# Patient Record
Sex: FEMALE | Race: WHITE | Employment: FULL TIME | URBAN - METROPOLITAN AREA
[De-identification: names, ages, dates, MRNs, and addresses within clinical notes are randomized per-mention and may not be internally consistent; named-entity substitution may affect disease eponyms.]

---

## 2017-07-27 DIAGNOSIS — H57.89 EYE IRRITATION: Primary | ICD-10-CM

## 2017-08-21 RX ORDER — IRBESARTAN AND HYDROCHLOROTHIAZIDE 300; 12.5 MG/1; MG/1
TABLET, FILM COATED ORAL
Qty: 30 TAB | Refills: 0 | Status: SHIPPED | OUTPATIENT
Start: 2017-08-21 | End: 2017-09-16 | Stop reason: SDUPTHER

## 2017-08-21 RX ORDER — METOPROLOL TARTRATE 50 MG/1
TABLET ORAL
Qty: 60 TAB | Refills: 0 | Status: SHIPPED | OUTPATIENT
Start: 2017-08-21 | End: 2017-09-16 | Stop reason: SDUPTHER

## 2017-08-21 NOTE — TELEPHONE ENCOUNTER
Requested Prescriptions     Pending Prescriptions Disp Refills    irbesartan-hydroCHLOROthiazide (AVALIDE) 300-12.5 mg per tablet [Pharmacy Med Name: IRBESARTAN-HCTZ 300-12.5 MG TB] 30 Tab 0     Sig: TAKE ONE TABLET BY MOUTH EVERY MORNING    metoprolol tartrate (LOPRESSOR) 50 mg tablet [Pharmacy Med Name: METOPROLOL TARTRATE 50 MG T] 60 Tab 0     Sig: TAKE 1 TABLET BY MOUTH TWICE A DAY.        Last Refill: 6-22-16  Last visit:4-25-17

## 2017-09-18 RX ORDER — METOPROLOL TARTRATE 50 MG/1
TABLET ORAL
Qty: 60 TAB | Refills: 0 | Status: SHIPPED | OUTPATIENT
Start: 2017-09-18 | End: 2017-10-21 | Stop reason: SDUPTHER

## 2017-09-18 RX ORDER — IRBESARTAN AND HYDROCHLOROTHIAZIDE 300; 12.5 MG/1; MG/1
TABLET, FILM COATED ORAL
Qty: 30 TAB | Refills: 0 | Status: SHIPPED | OUTPATIENT
Start: 2017-09-18 | End: 2017-10-21 | Stop reason: SDUPTHER

## 2017-10-23 RX ORDER — METOPROLOL TARTRATE 50 MG/1
TABLET ORAL
Qty: 60 TAB | Refills: 0 | Status: SHIPPED | OUTPATIENT
Start: 2017-10-23 | End: 2017-11-13 | Stop reason: SDUPTHER

## 2017-10-23 RX ORDER — IRBESARTAN AND HYDROCHLOROTHIAZIDE 300; 12.5 MG/1; MG/1
TABLET, FILM COATED ORAL
Qty: 30 TAB | Refills: 0 | Status: SHIPPED | OUTPATIENT
Start: 2017-10-23 | End: 2017-11-13 | Stop reason: SDUPTHER

## 2017-10-23 NOTE — TELEPHONE ENCOUNTER
Requested Prescriptions     Pending Prescriptions Disp Refills    irbesartan-hydroCHLOROthiazide (AVALIDE) 300-12.5 mg per tablet [Pharmacy Med Name: IRBESARTAN-HCTZ 300-12.5 MG TB] 30 Tab 0     Sig: TAKE ONE TABLET BY MOUTH EVERY MORNING    metoprolol tartrate (LOPRESSOR) 50 mg tablet [Pharmacy Med Name: METOPROLOL TARTRATE 50 MG T] 60 Tab 0     Sig: TAKE 1 TABLET BY MOUTH TWICE A DAY.        Last Refill: 9-18-17  Last visit:4-25-17

## 2017-10-24 ENCOUNTER — OFFICE VISIT (OUTPATIENT)
Dept: INTERNAL MEDICINE CLINIC | Age: 52
End: 2017-10-24

## 2017-10-24 VITALS
BODY MASS INDEX: 38.09 KG/M2 | SYSTOLIC BLOOD PRESSURE: 136 MMHG | WEIGHT: 207 LBS | DIASTOLIC BLOOD PRESSURE: 80 MMHG | HEART RATE: 72 BPM | HEIGHT: 62 IN

## 2017-10-24 DIAGNOSIS — K12.0 APHTHOUS STOMATITIS: ICD-10-CM

## 2017-10-24 DIAGNOSIS — S16.1XXA CERVICAL STRAIN, ACUTE, INITIAL ENCOUNTER: Primary | ICD-10-CM

## 2017-10-24 PROBLEM — K63.5 COLON POLYPS: Status: ACTIVE | Noted: 2017-10-24

## 2017-10-24 PROBLEM — K21.9 GERD WITH STRICTURE: Status: ACTIVE | Noted: 2017-10-24

## 2017-10-24 PROBLEM — K22.2 GERD WITH STRICTURE: Status: ACTIVE | Noted: 2017-10-24

## 2017-10-24 PROBLEM — F98.8 ADD (ATTENTION DEFICIT DISORDER): Status: ACTIVE | Noted: 2017-10-24

## 2017-10-24 PROBLEM — I10 HYPERTENSION: Status: ACTIVE | Noted: 2017-10-24

## 2017-10-24 PROBLEM — M19.90 DJD (DEGENERATIVE JOINT DISEASE): Status: ACTIVE | Noted: 2017-10-24

## 2017-10-24 RX ORDER — DICLOFENAC SODIUM 75 MG/1
TABLET, DELAYED RELEASE ORAL
COMMUNITY

## 2017-10-24 RX ORDER — TRIAMCINOLONE ACETONIDE 1 MG/G
PASTE DENTAL 2 TIMES DAILY
Qty: 5 G | Refills: 0 | Status: SHIPPED | OUTPATIENT
Start: 2017-10-24 | End: 2018-05-15 | Stop reason: ALTCHOICE

## 2017-10-24 NOTE — MR AVS SNAPSHOT
Visit Information Date & Time Provider Department Dept. Phone Encounter #  
 10/24/2017  3:30 PM Mark Ge Marissa Gonzalez 26 [phone] [id number] Follow-up Instructions Return if symptoms worsen or fail to improve. Upcoming Health Maintenance Date Due Hepatitis C Screening 1965 PAP AKA CERVICAL CYTOLOGY 4/6/1986 BREAST CANCER SCRN MAMMOGRAM 4/6/2015 FOBT Q 1 YEAR AGE 50-75 4/6/2015 INFLUENZA AGE 9 TO ADULT 8/1/2017 DTaP/Tdap/Td series (2 - Td) 12/14/2026 Allergies as of 10/24/2017  Review Complete On: 10/24/2017 By: Mark Ge MD  
  
 Severity Noted Reaction Type Reactions Minocin [Minocycline]  12/01/2011    Itching Skelaxin [Metaxalone]  12/01/2011    Itching Current Immunizations  Never Reviewed Name Date Tdap 12/14/2016  6:22 PM  
  
 Not reviewed this visit You Were Diagnosed With   
  
 Codes Comments Cervical strain, acute, initial encounter    -  Primary ICD-10-CM: S16. Leti Barriosinz ICD-9-CM: 847.0 Aphthous stomatitis     ICD-10-CM: K12.0 ICD-9-CM: 528.2 Vitals BP Pulse Height(growth percentile) Weight(growth percentile) BMI OB Status 136/80 (BP 1 Location: Left arm, BP Patient Position: Sitting) 72 5' 2\" (1.575 m) 207 lb (93.9 kg) 37.86 kg/m2 Having regular periods Smoking Status Never Smoker BMI and BSA Data Body Mass Index Body Surface Area  
 37.86 kg/m 2 2.03 m 2 Preferred Pharmacy Pharmacy Name Phone [address], [address] [place] [address] [phone] Your Updated Medication List  
  
   
This list is accurate as of: 10/24/17  4:26 PM.  Always use your most recent med list.  
  
  
  
  
 diclofenac EC 75 mg EC tablet Commonly known as:  VOLTAREN Take  by mouth. famotidine 10 mg tablet Commonly known as:  PEPCID Take 10 mg by mouth two (2) times a day. irbesartan-hydroCHLOROthiazide 300-12.5 mg per tablet Commonly known as:  AVALIDE  
TAKE ONE TABLET BY MOUTH EVERY MORNING  
  
 LO-OVRAL (8) 0.3-30 mg-mcg Tab Generic drug:  norgestrel-ethinyl estradiol Take 1 Tab by mouth daily. metoprolol tartrate 50 mg tablet Commonly known as:  LOPRESSOR  
TAKE 1 TABLET BY MOUTH TWICE A DAY. triamcinolone acetonide 0.1 % dental paste Commonly known as:  KENALOG  
by Dental route two (2) times a day. Prescriptions Sent to Pharmacy Refills  
 triamcinolone acetonide (KENALOG) 0.1 % dental paste 0 Sig: by Dental route two (2) times a day. Class: Normal  
 Pharmacy: [address] #: [phone] Route: Dental  
  
Follow-up Instructions Return if symptoms worsen or fail to improve. Introducing [facility]! Romayne Duster introduces [employer] patient portal. Now you can access parts of your medical record, email your doctor's office, and request medication refills online. 1. In your internet browser, go to [email or web address] 2. Click on the First Time User? Click Here link in the Sign In box. You will see the New Member Sign Up page. 3. Enter your [employer] Access Code exactly as it appears below. You will not need to use this code after youve completed the sign-up process. If you do not sign up before the expiration date, you must request a new code. · [employer] Access Code: [email or web address] Expires: 1/22/2018  3:29 PM 
 
4. Enter the last four digits of your Social Security Number (xxxx) and Date of Birth (mm/dd/yyyy) as indicated and click Submit. You will be taken to the next sign-up page. 5. Create a General Specifict ID. This will be your [employer] login ID and cannot be changed, so think of one that is secure and easy to remember. 6. Create a General Specifict password. You can change your password at any time. 7. Enter your Password Reset Question and Answer. This can be used at a later time if you forget your password. 8. Enter your e-mail address. You will receive e-mail notification when new information is available in [address] 9. Click Sign Up. You can now view and download portions of your medical record. 10. Click the Download Summary menu link to download a portable copy of your medical information. If you have questions, please visit the Frequently Asked Questions section of the [employer] website. Remember, [employer] is NOT to be used for urgent needs. For medical emergencies, dial 911. Now available from your iPhone and Android! Please provide this summary of care documentation to your next provider. Your primary care clinician is listed as VERONICA Kennedy 21. If you have any questions after today's visit, please call [phone].
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
## 2017-10-24 NOTE — PATIENT INSTRUCTIONS
Neck Strain: Care Instructions  Your Care Instructions  You have strained the muscles and ligaments in your neck. A sudden, awkward movement can strain the neck. This often occurs with falls or car accidents or during certain sports. Everyday activities like working on a computer or sleeping can also cause neck strain if they force you to hold your neck in an awkward position for a long time. It is common for neck pain to get worse for a day or two after an injury, but it should start to feel better after that. You may have more pain and stiffness for several days before it gets better. This is expected. It may take a few weeks or longer for it to heal completely. Good home treatment can help you get better faster and avoid future neck problems. Follow-up care is a key part of your treatment and safety. Be sure to make and go to all appointments, and call your doctor if you are having problems. It's also a good idea to know your test results and keep a list of the medicines you take. How can you care for yourself at home? · If you were given a neck brace (cervical collar) to limit neck motion, wear it as instructed for as many days as your doctor tells you to. Do not wear it longer than you were told to. Wearing a brace for too long can make neck stiffness worse and weaken the neck muscles. · You can try using heat or ice to see if it helps. ¨ Try using a heating pad on a low or medium setting for 15 to 20 minutes every 2 to 3 hours. Try a warm shower in place of one session with the heating pad. You can also buy single-use heat wraps that last up to 8 hours. ¨ You can also try an ice pack for 10 to 15 minutes every 2 to 3 hours. · Take pain medicines exactly as directed. ¨ If the doctor gave you a prescription medicine for pain, take it as prescribed. ¨ If you are not taking a prescription pain medicine, ask your doctor if you can take an over-the-counter medicine.   · Gently rub the area to relieve pain and help with blood flow. Do not massage the area if it hurts to do so. · Do not do anything that makes the pain worse. Take it easy for a couple of days. You can do your usual activities if they do not hurt your neck or put it at risk for more stress or injury. · Try sleeping on a special neck pillow. Place it under your neck, not under your head. Placing a tightly rolled-up towel under your neck while you sleep will also work. If you use a neck pillow or rolled towel, do not use your regular pillow at the same time. · To prevent future neck pain, do exercises to stretch and strengthen your neck and back. Learn how to use good posture, safe lifting techniques, and proper body mechanics. When should you call for help? Call 911 anytime you think you may need emergency care. For example, call if:  · You are unable to move an arm or a leg at all. Call your doctor now or seek immediate medical care if:  · You have new or worse symptoms in your arms, legs, chest, belly, or buttocks. Symptoms may include:  ¨ Numbness or tingling. ¨ Weakness. ¨ Pain. · You lose bladder or bowel control. Watch closely for changes in your health, and be sure to contact your doctor if:  · You are not getting better as expected. Where can you learn more? Go to http://tracie-joel.info/. Enter M253 in the search box to learn more about \"Neck Strain: Care Instructions. \"  Current as of: March 21, 2017  Content Version: 11.3  © 9576-4827 [employer]. Care instructions adapted under license by [employer] (which disclaims liability or warranty for this information). If you have questions about a medical condition or this instruction, always ask your healthcare professional. [address] any warranty or liability for your use of this information.
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
## 2018-02-12 ENCOUNTER — TELEPHONE (OUTPATIENT)
Dept: INTERNAL MEDICINE CLINIC | Age: 53
End: 2018-02-12

## 2018-02-12 NOTE — TELEPHONE ENCOUNTER
Patient had appt scheduled for 02/14/18 for ear pain and headache. Decided to cancel and be seen at the [facility] first and would like referral sent over.

## 2018-05-15 ENCOUNTER — OFFICE VISIT (OUTPATIENT)
Dept: INTERNAL MEDICINE CLINIC | Age: 53
End: 2018-05-15

## 2018-05-15 VITALS
HEART RATE: 80 BPM | WEIGHT: 211 LBS | SYSTOLIC BLOOD PRESSURE: 102 MMHG | OXYGEN SATURATION: 98 % | TEMPERATURE: 97.9 F | HEIGHT: 62 IN | BODY MASS INDEX: 38.83 KG/M2 | DIASTOLIC BLOOD PRESSURE: 70 MMHG

## 2018-05-15 DIAGNOSIS — J02.9 ACUTE PHARYNGITIS, UNSPECIFIED ETIOLOGY: Primary | ICD-10-CM

## 2018-05-15 RX ORDER — PANTOPRAZOLE SODIUM 40 MG/1
40 TABLET, DELAYED RELEASE ORAL DAILY
COMMUNITY

## 2018-05-15 RX ORDER — AMOXICILLIN 500 MG/1
500 CAPSULE ORAL 3 TIMES DAILY
Qty: 30 CAP | Refills: 0 | Status: SHIPPED | OUTPATIENT
Start: 2018-05-15 | End: 2018-05-25

## 2018-05-15 NOTE — LETTER
NOTIFICATION RETURN TO WORK / SCHOOL 
 
5/15/2018 9:37 AM 
 
Ms. Link Romero [address] [place] [address] [phone] To Whom It May Concern: 
 
Link Romero is currently under the care of Marissa Soni. She will return to work/school on: 5-16-18. If there are questions or concerns please have the patient contact our office. Sincerely, Jazmine Gupta MD

## 2018-05-15 NOTE — PATIENT INSTRUCTIONS
Sore Throat: Care Instructions  Your Care Instructions    Infection by bacteria or a virus causes most sore throats. Cigarette smoke, dry air, air pollution, allergies, and yelling can also cause a sore throat. Sore throats can be painful and annoying. Fortunately, most sore throats go away on their own. If you have a bacterial infection, your doctor may prescribe antibiotics. Follow-up care is a key part of your treatment and safety. Be sure to make and go to all appointments, and call your doctor if you are having problems. It's also a good idea to know your test results and keep a list of the medicines you take. How can you care for yourself at home? · If your doctor prescribed antibiotics, take them as directed. Do not stop taking them just because you feel better. You need to take the full course of antibiotics. · Gargle with warm salt water once an hour to help reduce swelling and relieve discomfort. Use 1 teaspoon of salt mixed in 1 cup of warm water. · Take an over-the-counter pain medicine, such as acetaminophen (Tylenol), ibuprofen (Advil, Motrin), or naproxen (Aleve). Read and follow all instructions on the label. · Be careful when taking over-the-counter cold or flu medicines and Tylenol at the same time. Many of these medicines have acetaminophen, which is Tylenol. Read the labels to make sure that you are not taking more than the recommended dose. Too much acetaminophen (Tylenol) can be harmful. · Drink plenty of fluids. Fluids may help soothe an irritated throat. Hot fluids, such as tea or soup, may help decrease throat pain. · Use over-the-counter throat lozenges to soothe pain. Regular cough drops or hard candy may also help. These should not be given to young children because of the risk of choking. · Do not smoke or allow others to smoke around you. If you need help quitting, talk to your doctor about stop-smoking programs and medicines.  These can increase your chances of quitting for good. · Use a vaporizer or humidifier to add moisture to your bedroom. Follow the directions for cleaning the machine. When should you call for help? Call your doctor now or seek immediate medical care if:  ? · You have new or worse trouble swallowing. ? · Your sore throat gets much worse on one side. ? Watch closely for changes in your health, and be sure to contact your doctor if you do not get better as expected. Where can you learn more? Go to http://rtacie-joel.info/. Enter 062 441 80 [id number] in the search box to learn more about \"Sore Throat: Care Instructions. \"  Current as of: May 12, 2017  Content Version: 11.4  © 7040-1731 [employer]. Care instructions adapted under license by [employer] (which disclaims liability or warranty for this information). If you have questions about a medical condition or this instruction, always ask your healthcare professional. [place] [address] any warranty or liability for your use of this information. Learning About Cutting Calories  How do calories affect your weight? Food gives your body energy. Energy from the food you eat is measured in calories. This energy keeps your heart beating, your brain active, and your muscles working. Your body needs a certain number of calories each day. After your body uses the calories it needs, it stores extra calories as fat. To lose weight safely, you have to eat fewer calories while eating in a healthy way. How many calories do you need each day? The more active you are, the more calories you need. When you are less active, you need fewer calories. How many calories you need each day also depends on several things, including your age and whether you are male or female. Here are some general guidelines for adults:  · Less active women and older adults need 1,600 to 2,000 calories each day.   · Active women and less active men need 2,000 to 2,400 calories each day.  · Active men need 2,400 to 3,000 calories each day. How can you cut calories and eat healthy meals? Whole grains, vegetables and fruits, and dried beans are good lower-calorie foods. They give you lots of nutrients and fiber. And they fill you up. Sweets, energy drinks, and soda pop are high in calories. They give you few nutrients and no fiber. Try to limit soda pop, fruit juice, and energy drinks. Drink water instead. Some fats can be part of a healthy diet. But cutting back on fats from highly processed foods like fast foods and many snack foods is a good way to lower the calories in your diet. Also, use smaller amounts of fats like butter, margarine, salad dressing, and mayonnaise. Add fresh garlic, lemon, or herbs to your meals to add flavor without adding fat. Meats and dairy products can be a big source of hidden fats. Try to choose lean or low-fat versions of these products. Fat-free cookies, candies, chips, and frozen treats can still be high in sugar and calories. Some fat-free foods have more calories than regular ones. Eat fat-free treats in moderation, as you would other foods. If your favorite foods are high in fat, salt, sugar, or calories, limit how often you eat them. Eat smaller servings, or look for healthy substitutes. Fill up on fruits, vegetables, and whole grains. Eating at home  · Use meat as a side dish instead of as the main part of your meal.  · Try main dishes that use whole wheat pasta, brown rice, dried beans, or vegetables. · Find ways to cook with little or no fat, such as broiling, steaming, or grilling. · Use cooking spray instead of oil. If you use oil, use a monounsaturated oil, such as canola or olive oil. · Trim fat from meats before you cook them. · Drain off fat after you brown the meat or while you roast it. · Chill soups and stews after you cook them. Then skim the fat off the top after it hardens.   Eating out  · Order foods that are broiled or poached rather than fried or breaded. · Cut back on the amount of butter or margarine that you use on bread. · Order sauces, gravies, and salad dressings on the side, and use only a little. · When you order pasta, choose tomato sauce rather than cream sauce. · Ask for salsa with your baked potato instead of sour cream, butter, cheese, or foreman. · Order meals in a small size instead of upgrading to a large. · Share an entree, or take part of your food home to eat as another meal.  · Share appetizers and desserts. Where can you learn more? Go to http://tracie-joel.info/. Enter 99 [id number] in the search box to learn more about \"Learning About Cutting Calories. \"  Current as of: May 12, 2017  Content Version: 11.4  © 4813-0142 [employer]. Care instructions adapted under license by [employer] (which disclaims liability or warranty for this information). If you have questions about a medical condition or this instruction, always ask your healthcare professional. [place] [address] any warranty or liability for your use of this information.

## 2018-05-15 NOTE — PROGRESS NOTES
Power Stevens is a 48 y.o. female presenting for Sore Throat and Ear Fullness  . 1. Have you been to the ER, urgent care clinic since your last visit? Hospitalized since your last visit? No    2. Have you seen or consulted any other health care providers outside of the [address] [place] [address] since your last visit? Include any pap smears or colon screening. No    No flowsheet data found. No flowsheet data found. PHQ over the last two weeks 5/15/2018   Little interest or pleasure in doing things Several days   Feeling down, depressed or hopeless Not at all   Total Score PHQ 2 1       There are no discontinued medications.

## 2018-05-15 NOTE — PROGRESS NOTES
This note will not be viewable in [address] Marco Obregon is a 48 y.o. female and presents with Sore Throat and Ear Fullness  . Subjective:  Mrs. Sam Baltazar presents to the office today with 3 days worth of a sore throat. She is noted neck gland swelling and tenderness. She ran a low-grade fever of 99.7 when taken by the school nurse yesterday. She states that she has been around a lot of sick kids were recently some of which had sore throats as well. She denies rhinorrhea or cough there is been no neck stiffness or rash. Past Medical History:   Diagnosis Date    ADD (attention deficit disorder) 10/24/2017    Colon polyps 10/24/2017    DJD (degenerative joint disease) 10/24/2017    GERD with stricture 10/24/2017    Hypertension      Past Surgical History:   Procedure Laterality Date    HX CHOLECYSTECTOMY      HX GYN          HX ORTHOPAEDIC      right ankle surgery     Allergies   Allergen Reactions    Minocin [Minocycline] Itching    Skelaxin [Metaxalone] Itching     Current Outpatient Prescriptions   Medication Sig Dispense Refill    pantoprazole (PROTONIX) 40 mg tablet Take 40 mg by mouth daily. Indications: takes 1/2 tab qd      amoxicillin (AMOXIL) 500 mg capsule Take 1 Cap by mouth three (3) times daily for 10 days. 30 Cap 0    metoprolol tartrate (LOPRESSOR) 50 mg tablet TAKE 1 TABLET BY MOUTH TWICE A DAY. 60 Tab 6    irbesartan-hydroCHLOROthiazide (AVALIDE) 300-12.5 mg per tablet TAKE ONE TABLET BY MOUTH EVERY MORNING 30 Tab 6    diclofenac EC (VOLTAREN) 75 mg EC tablet Take  by mouth.        Social History     Social History    Marital status:      Spouse name: N/A    Number of children: N/A    Years of education: N/A     Social History Main Topics    Smoking status: Never Smoker    Smokeless tobacco: Never Used    Alcohol use Yes      Comment: rarely    Drug use: No    Sexual activity: Not Asked     Other Topics Concern    None     Social History Narrative     Family History   Problem Relation Age of Onset    Cancer Mother      breast    Hypertension Father     Elevated Lipids Father        Review of Systems  Constitutional: negative for fevers, chills, anorexia and weight loss  Eyes:   negative for visual disturbance and irritation  ENT:   Positive for sore throat, drainage. Denies dysphageia. LN's tender. Respiratory:  negative for cough, hemoptysis, dyspnea,wheezing  CV:   negative for chest pain, palpitations, lower extremity edema  GI:   negative for nausea, vomiting, diarrhea, abdominal pain,melena  Integumentary: negative for rash and pruritus  Neurological:  negative for headaches, dizziness, vertigo, memory problems and gait       Objective:  Visit Vitals    /70 (BP 1 Location: Right arm, BP Patient Position: Sitting)    Pulse 80    Temp 97.9 °F (36.6 °C)    Ht 5' 2\" (1.575 m)    Wt 211 lb (95.7 kg)    SpO2 98%    BMI 38.59 kg/m2     Body mass index is 38.59 kg/(m^2). Physical Exam:   General appearance - alert, well appearing, and in no distress  Mental status - alert, oriented to person, place, and time  EYE-ANNMARIE, EOMI, sclera clear. No lid swelling or purulent drainage  ENT- TM's clear without A/F level. Pharynx erythematous with drainage noted  Nose - normal and patent, no erythema,  Neck - supple, with tender anterior nodes   Chest - clear to auscultation, no wheezes, rales or rhonchi, symmetric air entry   Heart - normal rate, regular rhythm, normal S1, S2, no murmurs, rubs, clicks or gallops   Skin-No rash appreciated  Neuro -alert, oriented, normal speech, no focal findings. Assessment/Plan:  Diagnoses and all orders for this visit:    Acute pharyngitis, unspecified etiology  -     amoxicillin (AMOXIL) 500 mg capsule; Take 1 Cap by mouth three (3) times daily for 10 days. , Normal, Disp-30 Cap, R-0        Other Instructions:  Warm salt water gargles to be started    Tylenol and chloraseptic spray to be used symptomatically    Increase po fluids    Body mass index is 38.59 and dietary counseling along with printed patient education is given    Follow-up Disposition:  Return if symptoms worsen or fail to improve. I have reviewed with the patient details of the assessment and plan and all questions were answered. Relevent patient education was performed. An After Visit Summary was printed and given to the patient.     Amelia Paez MD

## 2018-05-15 NOTE — MR AVS SNAPSHOT
[address] [address] [address] [address] [phone] Patient: Manoj Ruiz MRN: [id number] :1965 Visit Information Date & Time Provider Department Dept. Phone Encounter #  
 5/15/2018  9:20 AM Michael Rodrigues MD [address] [id number] Follow-up Instructions Return if symptoms worsen or fail to improve. Upcoming Health Maintenance Date Due Hepatitis C Screening 1965 PAP AKA CERVICAL CYTOLOGY 1986 BREAST CANCER SCRN MAMMOGRAM 2015 FOBT Q 1 YEAR AGE 50-75 2015 Influenza Age 5 to Adult 2018 DTaP/Tdap/Td series (2 - Td) 2026 Allergies as of 5/15/2018  Review Complete On: 5/15/2018 By: Michael Rodrigues MD  
  
 Severity Noted Reaction Type Reactions Minocin [Minocycline]  2011    Itching Skelaxin [Metaxalone]  2011    Itching Current Immunizations  Never Reviewed Name Date Tdap 2016  6:22 PM  
  
 Not reviewed this visit You Were Diagnosed With   
  
 Codes Comments Acute pharyngitis, unspecified etiology    -  Primary ICD-10-CM: J02.9 ICD-9-CM: 208 Vitals BP Pulse Temp Height(growth percentile) Weight(growth percentile) SpO2  
 102/70 (BP 1 Location: Right arm, BP Patient Position: Sitting) 80 97.9 °F (36.6 °C) 5' 2\" (1.575 m) 211 lb (95.7 kg) 98% BMI OB Status Smoking Status 38.59 kg/m2 Having regular periods Never Smoker BMI and BSA Data Body Mass Index Body Surface Area 38.59 kg/m 2 2.05 m 2 Preferred Pharmacy Pharmacy Name Phone [address], [address] [facility] [phone] Your Updated Medication List  
  
   
This list is accurate as of 5/15/18  9:37 AM.  Always use your most recent med list.  
  
  
  
  
 diclofenac EC 75 mg EC tablet Commonly known as:  VOLTAREN  
 Take  by mouth. irbesartan-hydroCHLOROthiazide 300-12.5 mg per tablet Commonly known as:  AVALIDE  
TAKE ONE TABLET BY MOUTH EVERY MORNING  
  
 metoprolol tartrate 50 mg tablet Commonly known as:  LOPRESSOR  
TAKE 1 TABLET BY MOUTH TWICE A DAY. pantoprazole 40 mg tablet Commonly known as:  PROTONIX Take 40 mg by mouth daily. Indications: takes 1/2 tab qd Follow-up Instructions Return if symptoms worsen or fail to improve. Introducing [facility]! Prerna Duenas introduces [employer] patient portal. Now you can access parts of your medical record, email your doctor's office, and request medication refills online. 1. In your internet browser, go to [email or web address] 2. Click on the First Time User? Click Here link in the Sign In box. You will see the New Member Sign Up page. 3. Enter your [employer] Access Code exactly as it appears below. You will not need to use this code after youve completed the sign-up process. If you do not sign up before the expiration date, you must request a new code. · [employer] Access Code: [email or web address] Expires: 8/13/2018  9:22 AM 
 
4. Enter the last four digits of your Social Security Number (xxxx) and Date of Birth (mm/dd/yyyy) as indicated and click Submit. You will be taken to the next sign-up page. 5. Create a [employer] ID. This will be your [employer] login ID and cannot be changed, so think of one that is secure and easy to remember. 6. Create a [employer] password. You can change your password at any time. 7. Enter your Password Reset Question and Answer. This can be used at a later time if you forget your password. 8. Enter your e-mail address. You will receive e-mail notification when new information is available in [address] 9. Click Sign Up. You can now view and download portions of your medical record. 10. Click the Download Summary menu link to download a portable copy of your medical information. If you have questions, please visit the Frequently Asked Questions section of the [employer] website. Remember, [employer] is NOT to be used for urgent needs. For medical emergencies, dial 911. Now available from your iPhone and Android! Please provide this summary of care documentation to your next provider. Your primary care clinician is listed as VERONICA Mcgowan. If you have any questions after today's visit, please call [phone].

## 2018-06-25 RX ORDER — IRBESARTAN AND HYDROCHLOROTHIAZIDE 300; 12.5 MG/1; MG/1
TABLET, FILM COATED ORAL
Qty: 30 TAB | Refills: 0 | Status: SHIPPED | OUTPATIENT
Start: 2018-06-25 | End: 2018-08-02 | Stop reason: SDUPTHER

## 2018-08-02 RX ORDER — METOPROLOL TARTRATE 50 MG/1
TABLET ORAL
Qty: 60 TAB | Refills: 6 | Status: SHIPPED | OUTPATIENT
Start: 2018-08-02 | End: 2018-08-21 | Stop reason: SDUPTHER

## 2018-08-02 RX ORDER — IRBESARTAN AND HYDROCHLOROTHIAZIDE 300; 12.5 MG/1; MG/1
TABLET, FILM COATED ORAL
Qty: 30 TAB | Refills: 0 | Status: SHIPPED | OUTPATIENT
Start: 2018-08-02 | End: 2018-08-21 | Stop reason: SDUPTHER

## 2018-08-02 NOTE — TELEPHONE ENCOUNTER
Pharmacy on file verified  Requested Prescriptions     Pending Prescriptions Disp Refills    irbesartan-hydroCHLOROthiazide (AVALIDE) 300-12.5 mg per tablet 30 Tab 0    metoprolol tartrate (LOPRESSOR) 50 mg tablet 60 Tab 6     LOV:5/15/18   MPW:0/15/6134

## 2018-08-21 ENCOUNTER — OFFICE VISIT (OUTPATIENT)
Dept: INTERNAL MEDICINE CLINIC | Age: 53
End: 2018-08-21

## 2018-08-21 VITALS
DIASTOLIC BLOOD PRESSURE: 72 MMHG | TEMPERATURE: 98.1 F | RESPIRATION RATE: 18 BRPM | HEART RATE: 69 BPM | WEIGHT: 212.2 LBS | BODY MASS INDEX: 39.05 KG/M2 | SYSTOLIC BLOOD PRESSURE: 114 MMHG | OXYGEN SATURATION: 96 % | HEIGHT: 62 IN

## 2018-08-21 DIAGNOSIS — I10 ESSENTIAL HYPERTENSION: Primary | ICD-10-CM

## 2018-08-21 RX ORDER — IRBESARTAN AND HYDROCHLOROTHIAZIDE 300; 12.5 MG/1; MG/1
TABLET, FILM COATED ORAL
Qty: 90 TAB | Refills: 3 | Status: SHIPPED | OUTPATIENT
Start: 2018-08-21

## 2018-08-21 RX ORDER — METOPROLOL TARTRATE 50 MG/1
TABLET ORAL
Qty: 180 TAB | Refills: 3 | Status: SHIPPED | OUTPATIENT
Start: 2018-08-21

## 2018-08-21 NOTE — PATIENT INSTRUCTIONS

## 2018-08-21 NOTE — MR AVS SNAPSHOT
[address] [address] [address] [id number] [phone] Patient: Tri Bhandari MRN: [id number] :1965 Visit Information Date & Time Provider Department Dept. Phone Encounter #  
 2018 11:00 AM Carla Colbert MD [address] [address] [phone] [id number] Follow-up Instructions Return in about 1 year (around 2019). Upcoming Health Maintenance Date Due Hepatitis C Screening 1965 PAP AKA CERVICAL CYTOLOGY 1986 BREAST CANCER SCRN MAMMOGRAM 2015 FOBT Q 1 YEAR AGE 50-75 2015 Influenza Age 5 to Adult 2018 DTaP/Tdap/Td series (2 - Td) 2026 Allergies as of 2018  Review Complete On: 2018 By: Carla Colbert MD  
  
 Severity Noted Reaction Type Reactions Minocin [Minocycline]  2011    Itching Skelaxin [Metaxalone]  2011    Itching Current Immunizations  Never Reviewed Name Date Tdap 2016  6:22 PM  
  
 Not reviewed this visit You Were Diagnosed With   
  
 Codes Comments Essential hypertension    -  Primary ICD-10-CM: I10 
ICD-9-CM: 401.9 Vitals BP Pulse Temp Resp Height(growth percentile) Weight(growth percentile) 114/72 (BP 1 Location: Right arm, BP Patient Position: Sitting) 69 98.1 °F (36.7 °C) (Oral) 18 5' 2\" (1.575 m) 212 lb 3.2 oz (96.3 kg) SpO2 BMI OB Status Smoking Status 96% 38.81 kg/m2 Having regular periods Never Smoker BMI and BSA Data Body Mass Index Body Surface Area  
 38.81 kg/m 2 2.05 m 2 Preferred Pharmacy Pharmacy Name Phone [address], [address] [place] [address] [phone] Your Updated Medication List  
  
   
This list is accurate as of 18 11:35 AM.  Always use your most recent med list.  
  
  
  
  
 AVENOVA 0.01 % Spry Generic drug:  hypochlorous acid-sodium chlor by Apply Externally route. diclofenac EC 75 mg EC tablet Commonly known as:  VOLTAREN Take  by mouth. irbesartan-hydroCHLOROthiazide 300-12.5 mg per tablet Commonly known as:  AVALIDE  
TAKE ONE TABLET BY MOUTH EVERY MORNING  
  
 metoprolol tartrate 50 mg tablet Commonly known as:  LOPRESSOR  
TAKE 1 TABLET BY MOUTH TWICE A DAY. nitroglycerin 2 % ointment Commonly known as:  NITROBID Apply 1 Inch to affected area every four (4) hours. pantoprazole 40 mg tablet Commonly known as:  PROTONIX Take 40 mg by mouth daily. Indications: takes 1/2 tab qd Prescriptions Sent to Pharmacy Refills  
 irbesartan-hydroCHLOROthiazide (AVALIDE) 300-12.5 mg per tablet 3 Sig: TAKE ONE TABLET BY MOUTH EVERY MORNING Class: Normal  
 Pharmacy: [id number] Ph #: [phone]  
 metoprolol tartrate (LOPRESSOR) 50 mg tablet 3 Sig: TAKE 1 TABLET BY MOUTH TWICE A DAY. Class: Normal  
 Pharmacy: [address], [address] [place] [address] [facility] Ph #: [phone] We Performed the Following CBC WITH AUTOMATED DIFF [03130 CPT(R)] COLLECTION VENOUS BLOOD,VENIPUNCTURE U3599648 CPT(R)] LIPID PANEL [66059 CPT(R)] METABOLIC PANEL, COMPREHENSIVE [29415 CPT(R)] URINALYSIS W/ RFLX MICROSCOPIC [81713 CPT(R)] Follow-up Instructions Return in about 1 year (around 8/21/2019). Introducing [facility]! Roseann Kirkpatrick introduces [employer] patient portal. Now you can access parts of your medical record, email your doctor's office, and request medication refills online. 1. In your internet browser, go to [email or web address] 2. Click on the First Time User? Click Here link in the Sign In box. You will see the New Member Sign Up page. 3. Enter your [employer] Access Code exactly as it appears below.  You will not need to use this code after youve completed the sign-up process. If you do not sign up before the expiration date, you must request a new code. · [employer] Access Code: [email or web address] Expires: 11/19/2018 11:01 AM 
 
4. Enter the last four digits of your Social Security Number (xxxx) and Date of Birth (mm/dd/yyyy) as indicated and click Submit. You will be taken to the next sign-up page. 5. Create a [employer] ID. This will be your Hi[employer] login ID and cannot be changed, so think of one that is secure and easy to remember. 6. Create a [employer] password. You can change your password at any time. 7. Enter your Password Reset Question and Answer. This can be used at a later time if you forget your password. 8. Enter your e-mail address. You will receive e-mail notification when new information is available in [address] 9. Click Sign Up. You can now view and download portions of your medical record. 10. Click the Download Summary menu link to download a portable copy of your medical information. If you have questions, please visit the Frequently Asked Questions section of the [employer] website. Remember, [employer] is NOT to be used for urgent needs. For medical emergencies, dial 911. Now available from your iPhone and Android! Please provide this summary of care documentation to your next provider. Your primary care clinician is listed as VERONICA Kennedy 21. If you have any questions after today's visit, please call [phone].

## 2018-08-21 NOTE — PROGRESS NOTES
This note will not be viewable in [address] Subjective:     Mrs. Simona Lopez returns to the office today in follow-up of her hypertension. She remains on Avalide and metoprolol. She is now retired and is been walking up to 3 miles per day. She follows a no added salt diet. She denies chest pain shortness of breath, dizziness or muscle cramping. She has had no headaches, numbness, tingling or focal neurological problems. Past Medical History:   Diagnosis Date    ADD (attention deficit disorder) 10/24/2017    Colon polyps 10/24/2017    DJD (degenerative joint disease) 10/24/2017    GERD with stricture 10/24/2017    Hypertension      Past Surgical History:   Procedure Laterality Date    HX CHOLECYSTECTOMY      HX GYN          HX ORTHOPAEDIC      right ankle surgery     Allergies   Allergen Reactions    Minocin [Minocycline] Itching    Skelaxin [Metaxalone] Itching     Current Outpatient Prescriptions   Medication Sig Dispense Refill    nitroglycerin (NITROBID) 2 % ointment Apply 1 Inch to affected area every four (4) hours.  hypochlorous acid-sodium chlor (AVENOVA) 0.01 % spry by Apply Externally route.  irbesartan-hydroCHLOROthiazide (AVALIDE) 300-12.5 mg per tablet TAKE ONE TABLET BY MOUTH EVERY MORNING 90 Tab 3    metoprolol tartrate (LOPRESSOR) 50 mg tablet TAKE 1 TABLET BY MOUTH TWICE A DAY. 180 Tab 3    pantoprazole (PROTONIX) 40 mg tablet Take 40 mg by mouth daily. Indications: takes 1/2 tab qd      diclofenac EC (VOLTAREN) 75 mg EC tablet Take  by mouth.        Social History     Social History    Marital status:      Spouse name: N/A    Number of children: N/A    Years of education: N/A     Social History Main Topics    Smoking status: Never Smoker    Smokeless tobacco: Never Used    Alcohol use Yes      Comment: rarely    Drug use: No    Sexual activity: Not Asked     Other Topics Concern    None     Social History Narrative     Family History Problem Relation Age of Onset    Cancer Mother      breast    Hypertension Father     Elevated Lipids Father        Review of Systems:  GEN: no weight loss, weight gain, fatigue or night sweats  CV: no PND, orthopnea, or palpitations  Resp: no dyspnea on exertion, no cough  Abd: no nausea, vomiting or diarrhea  EXT: denies edema, claudication  Endocrine: no hair loss, excessive thirst or polyuria  Neurological ROS: no TIA or stroke symptoms  ROS otherwise negative      Objective:     Visit Vitals    /72 (BP 1 Location: Right arm, BP Patient Position: Sitting)    Pulse 69    Temp 98.1 °F (36.7 °C) (Oral)    Resp 18    Ht 5' 2\" (1.575 m)    Wt 212 lb 3.2 oz (96.3 kg)    SpO2 96%    BMI 38.81 kg/m2     Body mass index is 38.81 kg/(m^2). General:   alert, cooperative and no distress   Eyes: conjunctivae/sclerae clear. PERRL, EOM's intact   Mouth:  No oral lesions, no pharyngeal erythema, no exudates   Neck: Trachea midline, no thyromegaly, no bruits   Heart: S1 and S2 normal,no murmurs noted    Lungs: Clear to auscultation bilaterally, no increased work of breathing   Abdomen: Soft, nontender.   Normal bowel sounds   Extremities: No edema or cyanosis   Neuro: ..alert, oriented x3,speech normal in context and clarity, cranial nerves II-XII intact,motor strength: full proximally and distally,gait: normal  reflexes: full and symmetric     Physical exam otherwise negative         Assessment/Plan:     Diagnoses and all orders for this visit:    Essential hypertension  -     COLLECTION VENOUS BLOOD,VENIPUNCTURE  -     CBC WITH AUTOMATED DIFF  -     METABOLIC PANEL, COMPREHENSIVE  -     LIPID PANEL  -     URINALYSIS W/ RFLX MICROSCOPIC  -     irbesartan-hydroCHLOROthiazide (AVALIDE) 300-12.5 mg per tablet; TAKE ONE TABLET BY MOUTH EVERY MORNING, Normal, Disp-90 Tab, R-3  -     metoprolol tartrate (LOPRESSOR) 50 mg tablet; TAKE 1 TABLET BY MOUTH TWICE A DAY., Normal, Disp-180 Tab, R-3        Other instructions: The patient's medications are reviewed and reconciled. No change in her current medical regimen is made. Continued weight loss and exercise is encouraged. Await results of ordered labs    Follow-up yearly    Follow-up Disposition:  Return in about 1 year (around 8/21/2019).     Umer Aguila MD

## 2018-08-21 NOTE — PROGRESS NOTES
Health Maintenance Due   Topic Date Due    Hepatitis C Screening  1965    PAP AKA CERVICAL CYTOLOGY  04/06/1986    BREAST CANCER SCRN MAMMOGRAM  04/06/2015    FOBT Q 1 YEAR AGE 50-75  04/06/2015    Influenza Age 5 to Adult  08/01/2018       Chief Complaint   Patient presents with    Follow Up Chronic Condition    Medication Refill       1. Have you been to the ER, urgent care clinic since your last visit? Hospitalized since your last visit? No    2. Have you seen or consulted any other health care providers outside of the [address] [place] [address] since your last visit? Include any pap smears or colon screening. No    3) Do you have an Advance Directive on file? no    4) Are you interested in receiving information on Advance Directives? NO      Patient is accompanied by self I have received verbal consent from Radha Velazquez to discuss any/all medical information while they are present in the room.

## 2018-08-22 LAB
ALBUMIN SERPL-MCNC: 4.7 G/DL (ref 3.5–5.5)
ALBUMIN/GLOB SERPL: 1.7 {RATIO} (ref 1.2–2.2)
ALP SERPL-CCNC: 131 IU/L (ref 39–117)
ALT SERPL-CCNC: 35 IU/L (ref 0–32)
APPEARANCE UR: CLEAR
AST SERPL-CCNC: 31 IU/L (ref 0–40)
BASOPHILS # BLD AUTO: 0.1 X10E3/UL (ref 0–0.2)
BASOPHILS NFR BLD AUTO: 1 %
BILIRUB SERPL-MCNC: 0.4 MG/DL (ref 0–1.2)
BILIRUB UR QL STRIP: NEGATIVE
BUN SERPL-MCNC: 14 MG/DL (ref 6–24)
BUN/CREAT SERPL: 19 (ref 9–23)
CALCIUM SERPL-MCNC: 9.5 MG/DL (ref 8.7–10.2)
CHLORIDE SERPL-SCNC: 99 MMOL/L (ref 96–106)
CHOLEST SERPL-MCNC: 183 MG/DL (ref 100–199)
CO2 SERPL-SCNC: 26 MMOL/L (ref 20–29)
COLOR UR: YELLOW
CREAT SERPL-MCNC: 0.73 MG/DL (ref 0.57–1)
EOSINOPHIL # BLD AUTO: 0.3 X10E3/UL (ref 0–0.4)
EOSINOPHIL NFR BLD AUTO: 4 %
ERYTHROCYTE [DISTWIDTH] IN BLOOD BY AUTOMATED COUNT: 14.6 % (ref 12.3–15.4)
GLOBULIN SER CALC-MCNC: 2.7 G/DL (ref 1.5–4.5)
GLUCOSE SERPL-MCNC: 84 MG/DL (ref 65–99)
GLUCOSE UR QL: NEGATIVE
HCT VFR BLD AUTO: 37.5 % (ref 34–46.6)
HDLC SERPL-MCNC: 63 MG/DL
HGB BLD-MCNC: 12.8 G/DL (ref 11.1–15.9)
HGB UR QL STRIP: NEGATIVE
IMM GRANULOCYTES # BLD: 0 X10E3/UL (ref 0–0.1)
IMM GRANULOCYTES NFR BLD: 0 %
KETONES UR QL STRIP: NEGATIVE
LDLC SERPL CALC-MCNC: 94 MG/DL (ref 0–99)
LEUKOCYTE ESTERASE UR QL STRIP: NEGATIVE
LYMPHOCYTES # BLD AUTO: 2 X10E3/UL (ref 0.7–3.1)
LYMPHOCYTES NFR BLD AUTO: 30 %
MCH RBC QN AUTO: 31.9 PG (ref 26.6–33)
MCHC RBC AUTO-ENTMCNC: 34.1 G/DL (ref 31.5–35.7)
MCV RBC AUTO: 94 FL (ref 79–97)
MICRO URNS: NORMAL
MONOCYTES # BLD AUTO: 0.6 X10E3/UL (ref 0.1–0.9)
MONOCYTES NFR BLD AUTO: 8 %
NEUTROPHILS # BLD AUTO: 3.8 X10E3/UL (ref 1.4–7)
NEUTROPHILS NFR BLD AUTO: 57 %
NITRITE UR QL STRIP: NEGATIVE
PH UR STRIP: 5 [PH] (ref 5–7.5)
PLATELET # BLD AUTO: 316 X10E3/UL (ref 150–379)
POTASSIUM SERPL-SCNC: 4.3 MMOL/L (ref 3.5–5.2)
PROT SERPL-MCNC: 7.4 G/DL (ref 6–8.5)
PROT UR QL STRIP: NEGATIVE
RBC # BLD AUTO: 4.01 X10E6/UL (ref 3.77–5.28)
SODIUM SERPL-SCNC: 138 MMOL/L (ref 134–144)
SP GR UR: 1.02 (ref 1–1.03)
TRIGL SERPL-MCNC: 132 MG/DL (ref 0–149)
UROBILINOGEN UR STRIP-MCNC: 0.2 MG/DL (ref 0.2–1)
VLDLC SERPL CALC-MCNC: 26 MG/DL (ref 5–40)
WBC # BLD AUTO: 6.7 X10E3/UL (ref 3.4–10.8)

## 2018-10-22 ENCOUNTER — OFFICE VISIT (OUTPATIENT)
Dept: INTERNAL MEDICINE CLINIC | Age: 53
End: 2018-10-22

## 2018-10-22 VITALS
DIASTOLIC BLOOD PRESSURE: 68 MMHG | WEIGHT: 217.4 LBS | SYSTOLIC BLOOD PRESSURE: 120 MMHG | HEART RATE: 78 BPM | BODY MASS INDEX: 40.01 KG/M2 | OXYGEN SATURATION: 98 % | HEIGHT: 62 IN

## 2018-10-22 DIAGNOSIS — H57.11 ACUTE RIGHT EYE PAIN: Primary | ICD-10-CM

## 2018-10-22 PROBLEM — E66.01 SEVERE OBESITY (HCC): Status: ACTIVE | Noted: 2018-10-22

## 2018-10-22 RX ORDER — ZINC GLUCONATE 10 MG
LOZENGE ORAL
COMMUNITY

## 2018-10-22 NOTE — PROGRESS NOTES
Jaron Tomas is a 48 y.o. female presenting for Eye Pain Patrick Counter 1. Have you been to the ER, urgent care clinic since your last visit? Hospitalized since your last visit? No 
 
2. Have you seen or consulted any other health care providers outside of the [address] [place] [address] since your last visit? Include any pap smears or colon screening. Yes When: Sept 2018 Where: Eye Dr Reason for visit: eye follow up. No flowsheet data found. No flowsheet data found. PHQ over the last two weeks 8/21/2018 Little interest or pleasure in doing things Not at all Feeling down, depressed, irritable, or hopeless Not at all Total Score PHQ 2 0 There are no discontinued medications.

## 2018-10-22 NOTE — PROGRESS NOTES
This note will not be viewable in [address] Subjective:  
 
Mrs. Rossi Pina presents to the office today with complaints of right eye pain. The pain began approximately 16-18 hours ago. The patient describes it as a sharp piercing pain in her right eye. She has had no foreign body sensation and no change in her visual acuity. She notes no eye redness or drainage. She has had no double vision. She denies any trauma to the eye. She states that she had this happen once before that but this is more intense. She is having no left eye symptoms. Past Medical History:  
Diagnosis Date  ADD (attention deficit disorder) 10/24/2017  Colon polyps 10/24/2017  DJD (degenerative joint disease) 10/24/2017  GERD with stricture 10/24/2017  Hypertension Past Surgical History:  
Procedure Laterality Date  HX CHOLECYSTECTOMY  HX GYN    
   HX ORTHOPAEDIC    
 right ankle surgery Allergies Allergen Reactions  Minocin [Minocycline] Itching  Skelaxin [Metaxalone] Itching Current Outpatient Medications Medication Sig Dispense Refill  magnesium 250 mg tab Take  by mouth.  Omega-3 Fatty Acids (FISH OIL) 500 mg cap Take  by mouth.  hypochlorous acid-sodium chlor (AVENOVA) 0.01 % spry by Apply Externally route.  irbesartan-hydroCHLOROthiazide (AVALIDE) 300-12.5 mg per tablet TAKE ONE TABLET BY MOUTH EVERY MORNING 90 Tab 3  
 metoprolol tartrate (LOPRESSOR) 50 mg tablet TAKE 1 TABLET BY MOUTH TWICE A DAY. 180 Tab 3  pantoprazole (PROTONIX) 40 mg tablet Take 40 mg by mouth daily. Indications: takes 1/2 tab qd    
 diclofenac EC (VOLTAREN) 75 mg EC tablet Take  by mouth. Social History Socioeconomic History  Marital status:  Spouse name: Not on file  Number of children: Not on file  Years of education: Not on file  Highest education level: Not on file Social Needs  Financial resource strain: Not on file  Food insecurity - worry: Not on file  Food insecurity - inability: Not on file  Transportation needs - medical: Not on file  Transportation needs - non-medical: Not on file Occupational History  Not on file Tobacco Use  Smoking status: Never Smoker  Smokeless tobacco: Never Used Substance and Sexual Activity  Alcohol use: Yes Comment: rarely  Drug use: No  
 Sexual activity: Not on file Other Topics Concern  Not on file Social History Narrative  Not on file Family History Problem Relation Age of Onset  Cancer Mother   
     breast  
 Hypertension Father  Elevated Lipids Father Review of Systems: 
GEN: no weight loss, weight gain, fatigue or night sweats EYE: Complains of sharp right eye pain without drainage or blurring of vision CV: no PND, orthopnea, or palpitations Resp: no dyspnea on exertion, no cough Abd: no nausea, vomiting or diarrhea EXT: denies edema, claudication Endocrine: no hair loss, excessive thirst or polyuria Neurological ROS: no TIA or stroke symptoms ROS otherwise negative Objective:  
 
Visit Vitals /68 (BP 1 Location: Left arm, BP Patient Position: Sitting) Pulse 78 Ht 5' 2\" (1.575 m) Wt 217 lb 6.4 oz (98.6 kg) SpO2 98% BMI 39.76 kg/m² Body mass index is 39.76 kg/m². General:   alert, cooperative and no distress Eyes: conjunctivae/sclerae clear. PERRL, EOM's intact. The sclera is clear. No foreign body is seen. No hyphema is present. There is no pain with palpation in the periorbital region and no redness is evident. Neuro: ..alert, oriented x3,speech normal in context and clarity, cranial nerves II-XII intact,motor strength: full proximally and distally,gait: normal 
reflexes: full and symmetric Physical exam otherwise negative Assessment/Plan:  
 
Diagnoses and all orders for this visit: 
 
Acute right eye pain 
-     REFERRAL TO OPHTHALMOLOGY Other instructions: The patient has acute right eye pain of unclear etiology. We will arrange for an urgent ophthalmology evaluation to have this further addressed. Follow-up here as previously scheduled Follow-up Disposition: 
Return if symptoms worsen or fail to improve.  
 
Edilma Rubio MD

## 2019-02-13 ENCOUNTER — TELEPHONE (OUTPATIENT)
Dept: INTERNAL MEDICINE CLINIC | Age: 54
End: 2019-02-13

## 2019-02-13 NOTE — TELEPHONE ENCOUNTER
Patient went to the dentist this morning BP was low at 95/58 x 3 times. Wants to know if she should cut back on her Metoprolol? States she has bee feeling slightly light headed. Also she is retired now and doesn't have as much stress on her.

## 2022-03-18 PROBLEM — I10 HYPERTENSION: Status: ACTIVE | Noted: 2017-10-24

## 2022-03-18 PROBLEM — K21.9 GERD WITH STRICTURE: Status: ACTIVE | Noted: 2017-10-24

## 2022-03-18 PROBLEM — K22.2 GERD WITH STRICTURE: Status: ACTIVE | Noted: 2017-10-24

## 2022-03-19 PROBLEM — S16.1XXA CERVICAL STRAIN, ACUTE, INITIAL ENCOUNTER: Status: ACTIVE | Noted: 2017-10-24

## 2022-03-20 PROBLEM — M19.90 DJD (DEGENERATIVE JOINT DISEASE): Status: ACTIVE | Noted: 2017-10-24

## 2022-03-20 PROBLEM — K63.5 COLON POLYPS: Status: ACTIVE | Noted: 2017-10-24

## 2022-03-20 PROBLEM — E66.01 SEVERE OBESITY (HCC): Status: ACTIVE | Noted: 2018-10-22

## 2022-03-20 PROBLEM — F98.8 ADD (ATTENTION DEFICIT DISORDER): Status: ACTIVE | Noted: 2017-10-24
